# Patient Record
(demographics unavailable — no encounter records)

---

## 2024-11-01 NOTE — CONSULT LETTER
[Dear  ___] : Dear  [unfilled], [Consult Letter:] : I had the pleasure of evaluating your patient, [unfilled]. [Please see my note below.] : Please see my note below. [Consult Closing:] : Thank you very much for allowing me to participate in the care of this patient.  If you have any questions, please do not hesitate to contact me. [Sincerely,] : Sincerely, [FreeTextEntry3] : Real Lora MD\par

## 2024-11-01 NOTE — ASSESSMENT
[FreeTextEntry1] : Mrs. 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  Her tremor is relatively well-controlled on her current regimen of primidone and propranolol.  She does not wish to consider more aggressive treatments like thermal ablation.  She will return to the office in spring 2025 for routine follow-up.  Telephone contact will be maintained in the meantime.

## 2024-11-01 NOTE — PHYSICAL EXAM
[FreeTextEntry1] : Constitutional:  Patient was well-developed, well-nourished and in no acute distress.   Head:  Normocephalic, atraumatic. Tympanic membranes were not examined.   Neck:  Supple with full range of motion.   Cardiovascular:  Cardiac rhythm was regular without murmur. There were no carotid bruits. Peripheral pulses were full and symmetric.   Respiratory:  Lungs were clear.   Abdomen:  Soft and nontender.   Spine:  Nontender.   Skin:  There were no rashes.   NEUROLOGICAL EXAMINATION:  Mental Status: Patient was alert and oriented. Speech was fluent. There was no dysarthria. There was no significant vocal tremor.  She had a to and fro head tremor.  Cranial Nerves:   II: She could finger count bilaterally. Pupils were equal and reactive. Visual fields were full.  Fundi were not examined.  III, IV, VI:  Eye movements were full without nystagmus.   V: Facial sensation was intact.   VII: Facial strength was normal.   VIII: Hearing was equal.   IX, X: Palatal movement was normal. Phonation was normal.   XI: Sternocleidomastoids and trapezii were normal.   XII: Tongue was midline and movements normal. There was no lingual atrophy or fasciculations.   Motor Examination: Muscle bulk, tone and strength were normal. There were left greater than right coarse postural tremors.  There were bilateral endpoint tremors.  There were decreased fine finger movements in both hands.  Sensory Examination: Pinprick, vibration and joint position sense were intact.   Reflexes: DTRs were 2+ throughout.   Plantar Responses: Plantar responses were flexor.   Coordination/Cerebellar Function: There was no dysmetria on finger to nose or heel to shin testing.   Gait/Stance: Gait was normal. Romberg was negative.  Tandem was mildly unsteady.

## 2024-11-01 NOTE — HISTORY OF PRESENT ILLNESS
[FreeTextEntry1] : Mrs. Elizabeth Alatorre returned to the office having been last seen on January 26, 2024. 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 She was very self-conscious of the tremor particularly during teaching sessions. Her left hand was involved more than the right. She denied hand or vocal tremor, cognitive, visual, hearing, swallowing, other motor, sensory, gait or sphincteric difficulties.  She was using extended release propranolol 120 mg/day.  On days when she taught, she sometimes took 240 mg.  In addition, she took propranolol 20 mg as needed.  She decreased her primidone dose from 150 to 100 mg at bedtime.  The higher dose made her fatigued.  She complained of tremor in her hands but not her head or voice.  Her left hand was particularly involved.  Overall, she was doing quite well.  At her January 2024 visit, Mrs. Alatorre continued to do well.  She complained of a slight stooped posture and imbalance.  Her hands and head shook a bit but not terribly.  She was on primidone 100 mg daily, propranolol extended release 120 mg daily and on the days that she taught she took short acting propranolol up to 80 mg extra. She had recently take a trip to [place] to visit her sister.  She was accompanied by her granddaughter who attends college at [employer].  She had a wonderful time.  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  She complains that her tremor is problematic typically when drinking a cup of tea.  She uses 2 hands.  Her mind is clear.  She complains of stress urinary incontinence.  She takes primidone 100 or 150 mg/day.  She takes propranolol extended release 120 mg once or twice a day and uses propranolol 20 mg as needed.  Medications include levothyroxine, escitalopram 20 mg daily, oxybutynin, propranolol extended release 120 mg daily, primidone 100 mg daily, tamoxifen and vitamin E.  Past surgical history is notable for excision of melanoma x 2, lumpectomy for breast cancer and inguinal herniorrhaphy.  She suffers from familial tremor, breast cancer, and hypothyroidism.  She has an allergy to penicillin.  She is a former smoker and nondrinker.  She is  and works as a nurse educator.  Family history is notable for a sister with tremor.  Her father was a user of alcohol and was a bit shaky as well.

## 2024-11-26 NOTE — HISTORY OF PRESENT ILLNESS
[de-identified] : Ms. JOVON GIVENS is a 75 year old woman here today for a follow-up visit   Past Hx: s/p LEFT breast lumpectomy with SLNB 1/20/16, path revealed T1bN0 invasive ductal carcinoma with 7 negative sentinel nodes, negative resection margins, ER+/WA+/HER2 (-). Oncotype Dx= 13. Subsequently completed Mammo site radiation (Dr. Kenya Mcclelland). In August 2016, she underwent I&D of a LEFT breast abscess with benign findings.   Patient reports she discontinued treatment with Letrozole due to severe joint pains (knees). She is now on Tamoxifen 20 mg daily.   B/L mammo/sono 6/2022 - increased density w/ calcifications at the posterior margin of the scar site in the left upper outer quadrant, stereotactic bx recommended, BI-RADS 4C s/p stereotactic bx 6/29/2022 with benign path (fibroadipose tissue w/ fat necrosis)   B/L mammo/sono 6/4/2024: BIRADS 2  Labs 6/10/24: WNL  labs 12/2024: ???  Denies palpable breast masses, nipple discharge, skin dimpling, inversion or breast pain.  Hypothyroidism (on Levothyroxine).   Internist: Dr. Donnie Perez.

## 2024-11-26 NOTE — ASSESSMENT
[FreeTextEntry1] : IMP: NATHALY- s/p LEFT breast lumpectomy for T1bN0 IDC 1/2016; ER+/LA+/HER2 (-); Oncotype Dx= 13; s/p RT On Tamoxifen 20 mg daily Hypothyroid- on Levothyroxine  B/L mammo/sono 6/2024- BIRADS 2  Labs 6/2024: WNL  Plan: RTO Q6 months with blood work B/L mammo/sono 6/2025

## 2024-12-12 NOTE — ASSESSMENT
[FreeTextEntry1] : IMP: NATHALY- s/p LEFT breast lumpectomy for T1bN0 IDC 1/2016; ER+/NH+/HER2 (-); Oncotype Dx= 13; s/p RT On Tamoxifen 20 mg daily Hypothyroid- on Levothyroxine  B/L mammo/sono 6/2024- BIRADS 2  Labs 12/2024: WNL  Plan: RTO Q6 months with blood work B/L mammo/sono 6/2025

## 2024-12-12 NOTE — ASSESSMENT
[FreeTextEntry1] : IMP: NATHALY- s/p LEFT breast lumpectomy for T1bN0 IDC 1/2016; ER+/GA+/HER2 (-); Oncotype Dx= 13; s/p RT On Tamoxifen 20 mg daily Hypothyroid- on Levothyroxine  B/L mammo/sono 6/2024- BIRADS 2  Labs 12/2024: WNL  Plan: RTO Q6 months with blood work B/L mammo/sono 6/2025

## 2024-12-12 NOTE — PHYSICAL EXAM
[Normal] : supple, no neck mass and thyroid not enlarged [Normal Supraclavicular Lymph Nodes] : normal supraclavicular lymph nodes [Normal Axillary Lymph Nodes] : normal axillary lymph nodes [Normal] : oriented to person, place and time, with appropriate affect [de-identified] : no masses bilaterally

## 2024-12-12 NOTE — PHYSICAL EXAM
[Normal] : supple, no neck mass and thyroid not enlarged [Normal Supraclavicular Lymph Nodes] : normal supraclavicular lymph nodes [Normal Axillary Lymph Nodes] : normal axillary lymph nodes [Normal] : oriented to person, place and time, with appropriate affect [de-identified] : no masses bilaterally

## 2024-12-12 NOTE — HISTORY OF PRESENT ILLNESS
[de-identified] : Ms. JOVON GIVENS is a 75 year old woman here today for a follow-up visit   Past Hx: s/p LEFT breast lumpectomy with SLNB 1/20/16, path revealed T1bN0 invasive ductal carcinoma with 7 negative sentinel nodes, negative resection margins, ER+/UT+/HER2 (-). Oncotype Dx= 13. Subsequently completed Mammo site radiation (Dr. Kenya Mcclelland). In August 2016, she underwent I&D of a LEFT breast abscess with benign findings.   Patient reports she discontinued treatment with Letrozole due to severe joint pains (knees). She is now on Tamoxifen 20 mg daily.   B/L mammo/sono 6/2022 - increased density w/ calcifications at the posterior margin of the scar site in the left upper outer quadrant, stereotactic bx recommended, BI-RADS 4C s/p stereotactic bx 6/29/2022 with benign path (fibroadipose tissue w/ fat necrosis)   B/L mammo/sono 6/4/2024: BIRADS 2  Labs 6/10/24: WNL  labs 12/2024: WNL  Denies palpable breast masses, nipple discharge, skin dimpling, inversion or breast pain.  Hypothyroidism (on Levothyroxine).   Internist: Dr. Donnie Perez.

## 2024-12-12 NOTE — HISTORY OF PRESENT ILLNESS
[de-identified] : Ms. JOVON GIVENS is a 75 year old woman here today for a follow-up visit   Past Hx: s/p LEFT breast lumpectomy with SLNB 1/20/16, path revealed T1bN0 invasive ductal carcinoma with 7 negative sentinel nodes, negative resection margins, ER+/NE+/HER2 (-). Oncotype Dx= 13. Subsequently completed Mammo site radiation (Dr. Kenya Mcclelland). In August 2016, she underwent I&D of a LEFT breast abscess with benign findings.   Patient reports she discontinued treatment with Letrozole due to severe joint pains (knees). She is now on Tamoxifen 20 mg daily.   B/L mammo/sono 6/2022 - increased density w/ calcifications at the posterior margin of the scar site in the left upper outer quadrant, stereotactic bx recommended, BI-RADS 4C s/p stereotactic bx 6/29/2022 with benign path (fibroadipose tissue w/ fat necrosis)   B/L mammo/sono 6/4/2024: BIRADS 2  Labs 6/10/24: WNL  labs 12/2024: WNL  Denies palpable breast masses, nipple discharge, skin dimpling, inversion or breast pain.  Hypothyroidism (on Levothyroxine).   Internist: Dr. Donnie Perez.

## 2025-06-26 NOTE — ASSESSMENT
[FreeTextEntry1] : IMP: NATHALY- s/p LEFT breast lumpectomy for T1bN0 IDC 1/2016; ER+/HI+/HER2 (-); Oncotype Dx= 13; s/p RT On Tamoxifen 20 mg daily Hypothyroid- on Levothyroxine  B/L mammo/sono 6/2024- BIRADS 2  Labs 12/2024: WNL Labs 5/2025: WNL B/L MMG/US 6/2025: B2 +CVA with residual   Plan: RTO yearly with blood work MMG/US 6/2026

## 2025-06-26 NOTE — HISTORY OF PRESENT ILLNESS
[de-identified] : Ms. JOVON GIVENS is a 76 year old woman here today for a follow-up visit   Past Hx: s/p LEFT breast lumpectomy with SLNB 1/20/16, path revealed T1bN0 invasive ductal carcinoma with 7 negative sentinel nodes, negative resection margins, ER+/WV+/HER2 (-). Oncotype Dx= 13. Subsequently completed Mammo site radiation (Dr. Kenya Mcclelland). In August 2016, she underwent I&D of a LEFT breast abscess with benign findings.   Patient reports she discontinued treatment with Letrozole due to severe joint pains (knees). She is now on Tamoxifen 20 mg daily.   B/L mammo/sono 6/2022 - increased density w/ calcifications at the posterior margin of the scar site in the left upper outer quadrant, stereotactic bx recommended, BI-RADS 4C s/p stereotactic bx 6/29/2022 with benign path (fibroadipose tissue w/ fat necrosis)  B/L mammo/sono 6/4/2024: BIRADS 2  Labs 6/10/24: WNL  labs 12/2024: WNL  labs 5/2025: WNL  MMG/US 6/2025: no evidence of malignancy. chronic left nipple inversion. BI-RADS 2  Denies palpable breast masses, nipple discharge, skin dimpling, inversion or breast pain.  Hypothyroidism (on Levothyroxine). 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  Tamoxifen stopped because of the CVA.   Internist: Dr. Donnie Perez.  Neurologist: Dr. Roa, Dr. Lora

## 2025-06-26 NOTE — HISTORY OF PRESENT ILLNESS
[de-identified] : Ms. JOVON GIVENS is a 76 year old woman here today for a follow-up visit   Past Hx: s/p LEFT breast lumpectomy with SLNB 1/20/16, path revealed T1bN0 invasive ductal carcinoma with 7 negative sentinel nodes, negative resection margins, ER+/WA+/HER2 (-). Oncotype Dx= 13. Subsequently completed Mammo site radiation (Dr. Kenya Mcclelland). In August 2016, she underwent I&D of a LEFT breast abscess with benign findings.   Patient reports she discontinued treatment with Letrozole due to severe joint pains (knees). She is now on Tamoxifen 20 mg daily.   B/L mammo/sono 6/2022 - increased density w/ calcifications at the posterior margin of the scar site in the left upper outer quadrant, stereotactic bx recommended, BI-RADS 4C s/p stereotactic bx 6/29/2022 with benign path (fibroadipose tissue w/ fat necrosis)  B/L mammo/sono 6/4/2024: BIRADS 2  Labs 6/10/24: WNL  labs 12/2024: WNL  labs 5/2025: WNL  MMG/US 6/2025: no evidence of malignancy. chronic left nipple inversion. BI-RADS 2  Denies palpable breast masses, nipple discharge, skin dimpling, inversion or breast pain.  Hypothyroidism (on Levothyroxine). 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  Tamoxifen stopped because of the CVA.   Internist: Dr. Donnie Perez.  Neurologist: Dr. Roa, Dr. Lora

## 2025-06-26 NOTE — ASSESSMENT
[FreeTextEntry1] : IMP: NATHALY- s/p LEFT breast lumpectomy for T1bN0 IDC 1/2016; ER+/MT+/HER2 (-); Oncotype Dx= 13; s/p RT On Tamoxifen 20 mg daily Hypothyroid- on Levothyroxine  B/L mammo/sono 6/2024- BIRADS 2  Labs 12/2024: WNL Labs 5/2025: WNL B/L MMG/US 6/2025: B2 +CVA with residual   Plan: RTO yearly with blood work MMG/US 6/2026